# Patient Record
Sex: FEMALE | Race: WHITE | NOT HISPANIC OR LATINO | Employment: STUDENT | URBAN - METROPOLITAN AREA
[De-identification: names, ages, dates, MRNs, and addresses within clinical notes are randomized per-mention and may not be internally consistent; named-entity substitution may affect disease eponyms.]

---
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
## 2025-01-04 ENCOUNTER — OFFICE VISIT (OUTPATIENT)
Dept: URGENT CARE | Facility: PHYSICIAN GROUP | Age: 20
End: 2025-01-04
Payer: COMMERCIAL

## 2025-01-04 VITALS
DIASTOLIC BLOOD PRESSURE: 62 MMHG | SYSTOLIC BLOOD PRESSURE: 110 MMHG | HEART RATE: 91 BPM | OXYGEN SATURATION: 96 % | BODY MASS INDEX: 29.82 KG/M2 | WEIGHT: 174.7 LBS | HEIGHT: 64 IN | TEMPERATURE: 98.2 F

## 2025-01-04 DIAGNOSIS — L73.9 FOLLICULITIS: ICD-10-CM

## 2025-01-04 DIAGNOSIS — R21 RASH: ICD-10-CM

## 2025-01-04 PROCEDURE — 99212 OFFICE O/P EST SF 10 MIN: CPT | Performed by: NURSE PRACTITIONER

## 2025-01-04 RX ORDER — CETIRIZINE HYDROCHLORIDE 10 MG/1
10 TABLET ORAL DAILY
Qty: 30 TABLET | Refills: 0 | Status: SHIPPED | OUTPATIENT
Start: 2025-01-04

## 2025-01-04 RX ORDER — CEPHALEXIN 500 MG/1
500 CAPSULE ORAL 4 TIMES DAILY
Qty: 28 CAPSULE | Refills: 0 | Status: SHIPPED | OUTPATIENT
Start: 2025-01-04 | End: 2025-01-11

## 2025-01-04 RX ORDER — MUPIROCIN 20 MG/G
1 OINTMENT TOPICAL 2 TIMES DAILY
Qty: 22 G | Refills: 0 | Status: SHIPPED | OUTPATIENT
Start: 2025-01-04 | End: 2025-01-14

## 2025-01-04 ASSESSMENT — FIBROSIS 4 INDEX: FIB4 SCORE: 0.31

## 2025-01-04 NOTE — PROGRESS NOTES
"Subjective:   Landen Woodruff  is a 19 y.o. female who presents for Rash (Left side of abdomen and left under armpit, non scented soap, using hydrocortisone cream and ointment. Itchy.)       Rash  This is a new problem. The current episode started in the past 7 days. The affected locations include the abdomen, left axilla and torso. The rash is characterized by itchiness. She was exposed to nothing. Past treatments include cold compress and moisturizer. Her past medical history is significant for eczema. There is no history of allergies or varicella.       Review of Systems   Skin:  Positive for rash.         CURRENT MEDICATIONS:  FLUoxetine Caps  NON SPECIFIED  norethindrone-ethinyl estradiol-iron  omeprazole  ondansetron Tbdp  Allergies:   Not on File  Current Problems: Landen Woodruff does not have a problem list on file.  Past Surgical Hx:  No past surgical history on file.   Past Social Hx:  reports that she has never smoked. She has never used smokeless tobacco. She reports that she does not drink alcohol and does not use drugs.    Objective:   /62 (BP Location: Right arm, Patient Position: Sitting, BP Cuff Size: Adult)   Pulse 91   Temp 36.8 °C (98.2 °F) (Temporal)   Ht 1.626 m (5' 4\")   Wt 79.2 kg (174 lb 11.2 oz)   SpO2 96%   BMI 29.99 kg/m²   Physical Exam  Vitals and nursing note reviewed.   Constitutional:       General: She is not in acute distress.     Appearance: Normal appearance. She is not ill-appearing.   HENT:      Head: Normocephalic and atraumatic.      Right Ear: External ear normal.      Left Ear: External ear normal.      Nose: Nose normal.   Eyes:      Extraocular Movements: Extraocular movements intact.      Conjunctiva/sclera: Conjunctivae normal.      Pupils: Pupils are equal, round, and reactive to light.   Cardiovascular:      Rate and Rhythm: Normal rate.   Pulmonary:      Effort: Pulmonary effort is normal.   Musculoskeletal:         General: Normal range of motion.      " Cervical back: Normal range of motion.   Skin:     General: Skin is warm and dry.      Findings: Rash present. Rash is macular and pustular.          Neurological:      General: No focal deficit present.      Mental Status: She is alert.       Assessment/Plan:   1. Folliculitis  - mupirocin (BACTROBAN) 2 % Ointment; Apply 1 Application topically 2 times a day for 10 days.  Dispense: 22 g; Refill: 0  - cetirizine (ZYRTEC ALLERGY) 10 MG Tab; Take 1 Tablet by mouth every day.  Dispense: 30 Tablet; Refill: 0  - cephALEXin (KEFLEX) 500 MG Cap; Take 1 Capsule by mouth 4 times a day for 7 days.  Dispense: 28 Capsule; Refill: 0    2. Rash    19-year-old female presents with concern for increasing rash over left axillary and torso.  Vital signs stable, afebrile.  No acute distress does not appear ill.  Focused exam demonstrates macular, pustular erythematous lesions sporadically across the left axillary and right lateral mid torso.  Suggestive of folliculitis.  Recommend topical mupirocin, Keflex and Zyrtec.  For my MDM, I have personally reviewed previous notes, and test results as pertinent to today's visit. Red flags, RTC and ER precautions discussed, with patient confirming their understanding of  need for immediate follow-up.  Discussed medication management options, risks and benefits, and alternatives to treatment plan agreed upon. Instructed to continue  medications without changes as ordered by primary care unless aforementioned above.  Patient expresses understanding and agrees to plan of care. All questions or concerns answered.     Please note that this dictation was created using voice recognition software. I have made every reasonable attempt to correct obvious errors,  but there may be grammar errors, and possibly content that I did not discover before finalizing the note.   This note was electronically signed by BORWN Wheeler

## 2025-01-08 ENCOUNTER — OFFICE VISIT (OUTPATIENT)
Dept: MEDICAL GROUP | Facility: PHYSICIAN GROUP | Age: 20
End: 2025-01-08
Payer: COMMERCIAL

## 2025-01-08 VITALS
HEART RATE: 84 BPM | BODY MASS INDEX: 29.64 KG/M2 | TEMPERATURE: 97.9 F | OXYGEN SATURATION: 95 % | SYSTOLIC BLOOD PRESSURE: 122 MMHG | WEIGHT: 173.6 LBS | HEIGHT: 64 IN | DIASTOLIC BLOOD PRESSURE: 68 MMHG

## 2025-01-08 DIAGNOSIS — Z30.41 ENCOUNTER FOR SURVEILLANCE OF CONTRACEPTIVE PILLS: ICD-10-CM

## 2025-01-08 DIAGNOSIS — N92.0 MENORRHAGIA WITH REGULAR CYCLE: ICD-10-CM

## 2025-01-08 DIAGNOSIS — K21.9 GASTROESOPHAGEAL REFLUX DISEASE WITHOUT ESOPHAGITIS: ICD-10-CM

## 2025-01-08 DIAGNOSIS — F41.9 ANXIETY: ICD-10-CM

## 2025-01-08 DIAGNOSIS — F32.A DEPRESSION, UNSPECIFIED DEPRESSION TYPE: ICD-10-CM

## 2025-01-08 PROCEDURE — 99214 OFFICE O/P EST MOD 30 MIN: CPT | Performed by: FAMILY MEDICINE

## 2025-01-08 PROCEDURE — 3078F DIAST BP <80 MM HG: CPT | Performed by: FAMILY MEDICINE

## 2025-01-08 PROCEDURE — 3074F SYST BP LT 130 MM HG: CPT | Performed by: FAMILY MEDICINE

## 2025-01-08 RX ORDER — NORETHINDRONE ACETATE AND ETHINYL ESTRADIOL, ETHINYL ESTRADIOL AND FERROUS FUMARATE 1MG-10(24)
1 KIT ORAL DAILY
Qty: 28 TABLET | Refills: 11 | Status: SHIPPED | OUTPATIENT
Start: 2025-01-08

## 2025-01-08 ASSESSMENT — FIBROSIS 4 INDEX: FIB4 SCORE: 0.31

## 2025-01-08 ASSESSMENT — PATIENT HEALTH QUESTIONNAIRE - PHQ9: CLINICAL INTERPRETATION OF PHQ2 SCORE: 0

## 2025-01-09 NOTE — PROGRESS NOTES
Verbal consent was acquired by the patient to use [email or web address] ambient listening note generation during this visit.    Subjective:     HPI:   History of Present Illness  The patient is a 19-year-old female presenting for a follow-up after a recent urgent care visit for a rash, diagnosed as folliculitis. She was evaluated on 01/04/2025 and treated with cephalexin (Keflex), mupirocin (Bactroban), and oral antihistamines. She reports notable improvement. The dermatitis was localized to the left side of her abdomen and the axilla. Initial self-treatment with over-the-counter hydrocortisone cream and ointment was ineffective. She reported associated pain and pruritus. Additionally, she has discontinued her oral contraceptive and wishes to discuss this further today.    She sought medical attention at an urgent care facility on Saturday, where she was diagnosed with a staphylococcal infection. The rash was located on the left side of her abdomen and in the axilla. Initial self-treatment with over-the-counter hydrocortisone cream and ointment was ineffective. She reported associated pain and pruritus. She notes significant improvement in her condition, with no current pruritus. She was prescribed an antibiotic, an antihistamine, and an antibiotic cream, which she has been using for the past 4 days. She has a 7-day course of antibiotics and has been informed that additional medication can be obtained from [facility] if necessary. She reports no discharge from the skin lesions and notes a reduction in erythema since starting the treatment.    Two weeks ago, she experienced flu-like symptoms, which she believes were contracted from her grandfather's girlfriend, who had similar symptoms. She also noticed a slight decrease in her white blood cell count. She reports no current nausea, vomiting, or diarrhea but mentions experiencing these symptoms last week, including episodes of vomiting for three days and persistent diarrhea. She also  "experienced lightheadedness and near-syncope.    She discontinued her oral contraceptive after one month due to adverse effects, including acne. She is considering more natural alternatives and has started using [employer] products, including a chemical-free feminine wash. She experienced severe dysmenorrhea, prompting her mother to suggest a consultation. She has been using ibuprofen for pain management but is concerned about potential gastrointestinal side effects. Her menstrual cycles are somewhat regular, occurring every 21 to 28 days, sometimes longer, with bleeding lasting 3 to 7 days. She reports menorrhagia when not on oral contraceptives. While on oral contraceptives, her menstrual flow was lighter, shorter, and the blood was brown rather than red. She is considering using a transdermal contraceptive patch. She has been off birth control for a month and has experienced severe dysmenorrhea.    She plans to resume taking omeprazole due to worsening gastroesophageal reflux disease (GERD) symptoms. She has stopped drinking coffee and has started drinking tea instead, which she knows is less acidic.    She reports that fluoxetine has been beneficial for her. She temporarily discontinued it while she was ill but noticed a significant difference in her well-being once she resumed the medication.    SOCIAL HISTORY  She works for [employer].    MEDICATIONS  Current: Keflex, Bactroban, fluoxetine  Discontinued: omeprazole    Health Maintenance: Completed    Objective:     Exam:  /68 (BP Location: Right arm, Patient Position: Sitting, BP Cuff Size: Adult)   Pulse 84   Temp 36.6 °C (97.9 °F) (Temporal)   Ht 1.626 m (5' 4\")   Wt 78.7 kg (173 lb 9.6 oz)   SpO2 95%   BMI 29.80 kg/m²  Body mass index is 29.8 kg/m².    Physical Exam  Vitals reviewed.   Constitutional:       Appearance: Normal appearance.   HENT:      Head: Normocephalic and atraumatic.      Right Ear: External ear normal.      Left " Ear: External ear normal.      Nose: Nose normal.   Cardiovascular:      Rate and Rhythm: Normal rate and regular rhythm.      Pulses: Normal pulses.      Heart sounds: Normal heart sounds. No murmur heard.  Pulmonary:      Effort: Pulmonary effort is normal. No respiratory distress.      Breath sounds: Normal breath sounds. No wheezing.   Abdominal:      General: Abdomen is flat.      Palpations: Abdomen is soft.   Skin:     General: Skin is warm and dry.   Neurological:      Mental Status: She is alert and oriented to person, place, and time.   Psychiatric:         Mood and Affect: Mood normal.         Behavior: Behavior normal.             Results      Assessment & Plan:     No diagnosis found.    Assessment & Plan  1. Folliculitis.  She was seen on 1/4/2025 and treated with Keflex, Bactroban, and oral allergy pills. She has noted improvement. The rash occurred on the left side of her abdomen and in the axilla. She had tried home hydrocortisone cream and ointment without relief. She did admit to associated pain and pruritus. She will continue her current antibiotic regimen for a total of 7 days. She is advised to monitor for any recurrence of symptoms post-antibiotic treatment and to inform the office if the rash does not continue to improve after discontinuation of the antibiotics. If necessary, a longer course of antibiotics may be considered.    2. Norovirus.  She reported experiencing flu-like symptoms two weeks ago, likely contracted from a household contact. Symptoms included nausea, vomiting, and diarrhea, which have since resolved. She is advised to stay well-hydrated and use antiemetics if needed for future episodes.    3. Menstrual cramps.  She discontinued her birth control due to side effects such as acne. Alternative hormonal treatments, including NuvaRing and patches, were discussed. She expressed interest in trying a lower dose of her current oral contraceptive. A prescription for Lo Loestrin (1  mg/10 mcg) will be sent to her pharmacy. She is advised to monitor for any side effects and report if the lower dose is not effective.    4. Gastroesophageal reflux disease (GERD).  She reported worsening acid reflux after discontinuing omeprazole. She will resume omeprazole on an as-needed basis, particularly before meals that may trigger symptoms. She is also advised to avoid coffee and consider using cold brew to reduce acidity.    5. Medication management.  She reported that fluoxetine has been beneficial for her. She temporarily stopped taking it while she was ill but noticed a significant difference in her well-being once she resumed the medication.    Follow-up  The patient will follow up in 6 to 8 weeks.        Return in about 2 months (around 3/8/2025) for contraception.    Please note that this dictation was created using voice recognition software. I have made every reasonable attempt to correct obvious errors, but I expect that there are errors of grammar and possibly content that I did not discover before finalizing the note.    Carina Paulson MD  Family Medicine and Non - Operative Sports Medicine   [facility]

## 2025-01-30 ENCOUNTER — OFFICE VISIT (OUTPATIENT)
Dept: DERMATOLOGY | Facility: IMAGING CENTER | Age: 20
End: 2025-01-30
Payer: COMMERCIAL

## 2025-01-30 DIAGNOSIS — L73.9 FOLLICULITIS: Primary | ICD-10-CM

## 2025-01-30 DIAGNOSIS — L30.9 ECZEMA, UNSPECIFIED TYPE: ICD-10-CM

## 2025-01-30 DIAGNOSIS — E66.3 OVERWEIGHT (BMI 25.0-29.9): ICD-10-CM

## 2025-01-30 DIAGNOSIS — L81.9 HYPERPIGMENTATION: ICD-10-CM

## 2025-01-30 PROBLEM — E66.9 OBESITY: Status: ACTIVE | Noted: 2025-01-30

## 2025-01-30 PROBLEM — E66.9 OBESITY: Status: RESOLVED | Noted: 2025-01-30 | Resolved: 2025-01-30

## 2025-01-30 NOTE — PROGRESS NOTES
"CC: spot check     Subjective: new patient here for skin concern:    HPI: spot check staph infection , finished course of antibiotics given in urgent care - Keflex  Location: abd , underneath arm   Time present: x 1 months   Painful lesion: No  Itching lesion: No  Enlarging lesion: No  Anything make it better or worse?no     Had been red spots, flattened out considerably. Bothered by dark spots and reports desire to treat further with antibiotics.     Hands - occ eczema flares - uses topical steroid for a week, improves and then recurs. Does not want to use topicals Rx - preference for OTCs. \"Really like my LaRoche Posay products and cetaphil moisturizer has worked the best.\"    ROS: no fevers/chills. No itch.  No cough. Mild nausea, loss of appetite  Relevant PMH: OCP, obesity. Denies pregnancy. Reports stomach bug at work recently  Social: NS    PE: Gen:WDWN female in NAD. Skin: face - comedones. No notable edema. Left axillary vault/abdomen: scattered hyperpigmented macules. No papules, pustules. No notable erythematous patches. Left dorsal hand - xerotic dermatitis      A/P: hx eczema/xerotic dermatitis: hands  -advised management for mild eczema  -declined topical steroid/non-steroid Rx  -use OTC HCT cream as needed  -cetaphil/other moisturizer daily and after hand washing  -OTC antihistamine for itching  -f/u PRN    Cell phone photos showing what appears to be folliculitis and exam consistent with PIPA:  -reassurance  -RTC for worsening  -reviewed sunprotection and OTC bleaching creams PRN    Nausea / loss of appetite symptoms: may be effect after recent GI illness  -reviewed precautions - stay hydrated if vomiting develops, observe for worsening/other symptoms  -f/u UC/PCP as needed  I have reviewed medications relevant to my specialty.          "

## 2025-02-27 RX ORDER — NORETHINDRONE ACETATE AND ETHINYL ESTRADIOL, ETHINYL ESTRADIOL AND FERROUS FUMARATE 1MG-10(24)
1 KIT ORAL DAILY
Qty: 28 TABLET | Refills: 11 | Status: SHIPPED | OUTPATIENT
Start: 2025-02-27

## 2025-02-27 NOTE — TELEPHONE ENCOUNTER
Received request via: Patient    Was the patient seen in the last year in this department? Yes    Does the patient have an active prescription (recently filled or refills available) for medication(s) requested? No    Pharmacy Name: [id number] - [place], [place] - [address]      Does the patient have jail Plus and need 100-day supply? (This applies to ALL medications) Patient does not have SCP

## 2025-03-12 ENCOUNTER — OFFICE VISIT (OUTPATIENT)
Dept: MEDICAL GROUP | Facility: PHYSICIAN GROUP | Age: 20
End: 2025-03-12
Payer: COMMERCIAL

## 2025-03-12 VITALS
SYSTOLIC BLOOD PRESSURE: 120 MMHG | HEART RATE: 85 BPM | DIASTOLIC BLOOD PRESSURE: 68 MMHG | OXYGEN SATURATION: 98 % | BODY MASS INDEX: 30.15 KG/M2 | TEMPERATURE: 98.3 F | WEIGHT: 176.6 LBS | HEIGHT: 64 IN

## 2025-03-12 DIAGNOSIS — K21.9 GASTROESOPHAGEAL REFLUX DISEASE WITHOUT ESOPHAGITIS: ICD-10-CM

## 2025-03-12 DIAGNOSIS — E66.9 OBESITY (BMI 30-39.9): ICD-10-CM

## 2025-03-12 DIAGNOSIS — F32.A DEPRESSION, UNSPECIFIED DEPRESSION TYPE: ICD-10-CM

## 2025-03-12 DIAGNOSIS — Z30.41 ENCOUNTER FOR SURVEILLANCE OF CONTRACEPTIVE PILLS: ICD-10-CM

## 2025-03-12 PROCEDURE — 3074F SYST BP LT 130 MM HG: CPT | Performed by: FAMILY MEDICINE

## 2025-03-12 PROCEDURE — 99214 OFFICE O/P EST MOD 30 MIN: CPT | Performed by: FAMILY MEDICINE

## 2025-03-12 PROCEDURE — 3078F DIAST BP <80 MM HG: CPT | Performed by: FAMILY MEDICINE

## 2025-03-12 ASSESSMENT — FIBROSIS 4 INDEX: FIB4 SCORE: 0.31

## 2025-03-12 NOTE — PROGRESS NOTES
"Verbal consent was acquired by the patient to use [employer] ambient listening note generation during this visit.    Subjective:     HPI:   History of Present Illness  Patient presents today for follow-up.  Patient reports he has been doing well on new contraception and denies any significant side effects.  Patient does reports she has had some irregular menstrual cycles due to starting a new medication but hopes that those will normalize as her body acclimates.  Patient has been on the low Loestrin for approximately 2 months.  She has had 2 periods which were back-to-back approximately 4 days apart.    Patient has continued to take over-the-counter generic Prilosec which has helped with the nausea as well as the acid reflux symptoms.  Patient was also encouraged to keep a food diary and avoid certain foods as needed.  Patient was additionally instructed to increase the time between eating and sleep.    Additionally patient reports she has been doing well on the fluoxetine however reports she has had some that it is not as effective as it previously was.  Patient is interested in doing some therapy in addition to the 20 mg of fluoxetine.  She hopes this will help to keep her from needing to increase the dose.  Patient reports she has been making new friends and has been out and about more and has been focusing less on her mental health recently which may be contributing.  Patient denies any SI or HI.    Health Maintenance: Completed    Objective:     Exam:  /68 (BP Location: Right arm, Patient Position: Sitting, BP Cuff Size: Adult)   Pulse 85   Temp 36.8 °C (98.3 °F) (Temporal)   Ht 1.626 m (5' 4\")   Wt 80.1 kg (176 lb 9.6 oz)   SpO2 98%   BMI 30.31 kg/m²  Body mass index is 30.31 kg/m².    Physical Exam  Vitals reviewed.   Constitutional:       Appearance: Normal appearance.   HENT:      Head: Normocephalic and atraumatic.      Right Ear: External ear normal.      Left Ear: External ear normal.      " Nose: Nose normal.   Cardiovascular:      Rate and Rhythm: Normal rate and regular rhythm.      Pulses: Normal pulses.      Heart sounds: Normal heart sounds. No murmur heard.  Pulmonary:      Effort: Pulmonary effort is normal. No respiratory distress.      Breath sounds: Normal breath sounds. No wheezing.   Abdominal:      General: Abdomen is flat.      Palpations: Abdomen is soft.   Skin:     General: Skin is warm and dry.   Neurological:      Mental Status: She is alert and oriented to person, place, and time.   Psychiatric:         Mood and Affect: Mood normal.         Behavior: Behavior normal.             Results      Assessment & Plan:     1. Obesity (BMI 30-39.9)  Patient identified as having weight management issue.  Appropriate orders and counseling given.      2. Depression, unspecified depression type  Referral to Behavioral Health      3. Gastroesophageal reflux disease without esophagitis        4. Encounter for surveillance of contraceptive pills            Assessment & Plan  Contraception  Continue low Loestrin as patient has tolerated it well.  Continue to monitor for normal cycles.    Depression  Continue fluoxetine 20 mg daily.  Referral placed for behavioral health for individual therapy.    GERD  Continue over-the-counter Prilosec as needed.  Food triggers to diary avoidance of certain foods with advised in addition to increasing the time between eating and bedtime.        Return in about 6 months (around 9/12/2025) for Med check.    Please note that this dictation was created using voice recognition software. I have made every reasonable attempt to correct obvious errors, but I expect that there are errors of grammar and possibly content that I did not discover before finalizing the note.    Carina Paulson MD  Family Medicine and Non - Operative Sports Medicine   [facility]

## 2025-03-18 NOTE — Clinical Note
----- Message from Irene Day sent at 4/10/2019 11:11 AM CDT -----  Contact: Bernarda      ----- Message -----  From: Kelsey Ortega  Sent: 4/10/2019   8:05 AM  To: Daren RAND Staff    Needs Advice    Reason for call: pt was returning a call from our office.         Communication Preference: [phone]    Additional Information:     REFERRAL APPROVAL NOTICE         Sent on March 18, 2025                   Landen Woodruff  [address] [place] [address]                   Dear Ms. Woodruff,    After a careful review of the medical information and benefit coverage, [facility] has processed your referral. See below for additional details.    If applicable, you must be actively enrolled with your insurance for coverage of the authorized service. If you have any questions regarding your coverage, please contact your insurance directly.    REFERRAL INFORMATION   Referral #:  [id number]  Referred-To Department    Referred-By Provider:  Behavioral Health    Carina Paulson M.D.   Behavioral Health Outpatient      [address]  [place] [address] [phone] [address] [facility] [address]  [place] [id number] [phone]    Referral Start Date:  03/12/2025  Referral End Date:   03/12/2026             SCHEDULING  If you do not already have an appointment, please call [phone] to make an appointment.     MORE INFORMATION  If you do not already have a [employer] account, sign up at: [email or web address]  You can access your medical information, make appointments, see lab results, billing information, and more.  If you have questions regarding this referral, please contact  the [facility] Referrals department at:             [phone]. Monday - Friday 8:00AM - 5:00PM.     Sincerely,    [facility]

## 2025-03-28 RX ORDER — NORETHINDRONE ACETATE AND ETHINYL ESTRADIOL, ETHINYL ESTRADIOL AND FERROUS FUMARATE 1MG-10(24)
1 KIT ORAL DAILY
Qty: 84 TABLET | Refills: 0 | Status: SHIPPED | OUTPATIENT
Start: 2025-03-28

## 2025-03-28 NOTE — TELEPHONE ENCOUNTER
Received request via: Patient    Was the patient seen in the last year in this department? Yes    Does the patient have an active prescription (recently filled or refills available) for medication(s) requested? No    Pharmacy Name: [facility]pharmacy #[id number] - [place], [place] - [address]     Does the patient have retirement Plus and need 100-day supply? (This applies to ALL medications) Patient does not have SCP

## 2025-05-19 DIAGNOSIS — Z30.011 ENCOUNTER FOR INITIAL PRESCRIPTION OF CONTRACEPTIVE PILLS: Primary | ICD-10-CM

## 2025-05-19 RX ORDER — NORETHINDRONE ACETATE AND ETHINYL ESTRADIOL 1.5-30(21)
1 KIT ORAL DAILY
Qty: 84 TABLET | Refills: 3 | Status: SHIPPED | OUTPATIENT
Start: 2025-05-19

## 2025-08-22 ENCOUNTER — OFFICE VISIT (OUTPATIENT)
Dept: URGENT CARE | Facility: PHYSICIAN GROUP | Age: 20
End: 2025-08-22
Payer: COMMERCIAL

## 2025-08-22 VITALS
RESPIRATION RATE: 18 BRPM | BODY MASS INDEX: 31.39 KG/M2 | DIASTOLIC BLOOD PRESSURE: 64 MMHG | HEIGHT: 64 IN | HEART RATE: 100 BPM | OXYGEN SATURATION: 99 % | SYSTOLIC BLOOD PRESSURE: 100 MMHG | TEMPERATURE: 99 F | WEIGHT: 183.86 LBS

## 2025-08-22 DIAGNOSIS — G44.209 ACUTE NON INTRACTABLE TENSION-TYPE HEADACHE: Primary | ICD-10-CM

## 2025-08-22 DIAGNOSIS — R11.0 NAUSEA: ICD-10-CM

## 2025-08-22 PROCEDURE — 3074F SYST BP LT 130 MM HG: CPT | Performed by: NURSE PRACTITIONER

## 2025-08-22 PROCEDURE — 99213 OFFICE O/P EST LOW 20 MIN: CPT | Performed by: NURSE PRACTITIONER

## 2025-08-22 PROCEDURE — 3078F DIAST BP <80 MM HG: CPT | Performed by: NURSE PRACTITIONER

## 2025-08-22 RX ORDER — KETOROLAC TROMETHAMINE 15 MG/ML
15 INJECTION, SOLUTION INTRAMUSCULAR; INTRAVENOUS ONCE
Status: COMPLETED | OUTPATIENT
Start: 2025-08-22 | End: 2025-08-22

## 2025-08-22 RX ORDER — ONDANSETRON 4 MG/1
4 TABLET, ORALLY DISINTEGRATING ORAL ONCE
Status: COMPLETED | OUTPATIENT
Start: 2025-08-22 | End: 2025-08-22

## 2025-08-22 RX ORDER — ONDANSETRON 4 MG/1
4 TABLET, ORALLY DISINTEGRATING ORAL EVERY 8 HOURS PRN
Qty: 10 TABLET | Refills: 0 | Status: SHIPPED | OUTPATIENT
Start: 2025-08-22

## 2025-08-22 RX ADMIN — KETOROLAC TROMETHAMINE 15 MG: 15 INJECTION, SOLUTION INTRAMUSCULAR; INTRAVENOUS at 20:39

## 2025-08-22 RX ADMIN — ONDANSETRON 4 MG: 4 TABLET, ORALLY DISINTEGRATING ORAL at 20:36

## 2025-08-22 ASSESSMENT — ENCOUNTER SYMPTOMS
FEVER: 0
BLURRED VISION: 1
VOMITING: 0
HEADACHES: 1
SHORTNESS OF BREATH: 0
DIARRHEA: 0
SPEECH CHANGE: 0
SENSORY CHANGE: 0
WEAKNESS: 0
LOSS OF CONSCIOUSNESS: 0
PHOTOPHOBIA: 1
SORE THROAT: 0
COUGH: 0
NAUSEA: 1
DIZZINESS: 1
ABDOMINAL PAIN: 0
CONSTIPATION: 0
RESPIRATORY NEGATIVE: 1

## 2025-08-22 ASSESSMENT — VISUAL ACUITY: OU: 1

## 2025-08-22 ASSESSMENT — FIBROSIS 4 INDEX: FIB4 SCORE: 0.31

## 2025-09-02 ENCOUNTER — APPOINTMENT (OUTPATIENT)
Dept: MEDICAL GROUP | Facility: PHYSICIAN GROUP | Age: 20
End: 2025-09-02
Payer: COMMERCIAL